# Patient Record
Sex: FEMALE | Race: OTHER | HISPANIC OR LATINO | URBAN - METROPOLITAN AREA
[De-identification: names, ages, dates, MRNs, and addresses within clinical notes are randomized per-mention and may not be internally consistent; named-entity substitution may affect disease eponyms.]

---

## 2022-11-28 ENCOUNTER — EMERGENCY (EMERGENCY)
Facility: HOSPITAL | Age: 67
LOS: 1 days | Discharge: HOME | End: 2022-11-28
Attending: EMERGENCY MEDICINE
Payer: MEDICAID

## 2022-11-28 VITALS
SYSTOLIC BLOOD PRESSURE: 128 MMHG | OXYGEN SATURATION: 98 % | DIASTOLIC BLOOD PRESSURE: 79 MMHG | RESPIRATION RATE: 18 BRPM | TEMPERATURE: 98 F | HEART RATE: 72 BPM

## 2022-11-28 VITALS
TEMPERATURE: 98 F | WEIGHT: 152.12 LBS | RESPIRATION RATE: 17 BRPM | HEART RATE: 78 BPM | HEIGHT: 59.84 IN | SYSTOLIC BLOOD PRESSURE: 135 MMHG | DIASTOLIC BLOOD PRESSURE: 73 MMHG

## 2022-11-28 LAB
ALBUMIN SERPL ELPH-MCNC: 4.1 G/DL — SIGNIFICANT CHANGE UP (ref 3.3–5)
ALP SERPL-CCNC: 166 U/L — HIGH (ref 40–120)
ALT FLD-CCNC: 17 U/L — SIGNIFICANT CHANGE UP (ref 10–45)
ANION GAP SERPL CALC-SCNC: 9 MMOL/L — SIGNIFICANT CHANGE UP (ref 5–17)
APPEARANCE UR: CLEAR — SIGNIFICANT CHANGE UP
AST SERPL-CCNC: 18 U/L — SIGNIFICANT CHANGE UP (ref 10–40)
BASE EXCESS BLDV CALC-SCNC: 5.1 MMOL/L — HIGH (ref -2–3)
BASOPHILS # BLD AUTO: 0.05 K/UL — SIGNIFICANT CHANGE UP (ref 0–0.2)
BASOPHILS NFR BLD AUTO: 0.6 % — SIGNIFICANT CHANGE UP (ref 0–2)
BILIRUB SERPL-MCNC: 0.3 MG/DL — SIGNIFICANT CHANGE UP (ref 0.2–1.2)
BILIRUB UR-MCNC: NEGATIVE — SIGNIFICANT CHANGE UP
BUN SERPL-MCNC: 11 MG/DL — SIGNIFICANT CHANGE UP (ref 7–23)
CA-I SERPL-SCNC: 1.24 MMOL/L — SIGNIFICANT CHANGE UP (ref 1.15–1.33)
CALCIUM SERPL-MCNC: 9.4 MG/DL — SIGNIFICANT CHANGE UP (ref 8.4–10.5)
CHLORIDE BLDV-SCNC: 104 MMOL/L — SIGNIFICANT CHANGE UP (ref 96–108)
CHLORIDE SERPL-SCNC: 104 MMOL/L — SIGNIFICANT CHANGE UP (ref 96–108)
CO2 BLDV-SCNC: 34 MMOL/L — HIGH (ref 22–26)
CO2 SERPL-SCNC: 29 MMOL/L — SIGNIFICANT CHANGE UP (ref 22–31)
COLOR SPEC: SIGNIFICANT CHANGE UP
CREAT SERPL-MCNC: 0.6 MG/DL — SIGNIFICANT CHANGE UP (ref 0.5–1.3)
DIFF PNL FLD: NEGATIVE — SIGNIFICANT CHANGE UP
EGFR: 97 ML/MIN/1.73M2 — SIGNIFICANT CHANGE UP
EOSINOPHIL # BLD AUTO: 0.15 K/UL — SIGNIFICANT CHANGE UP (ref 0–0.5)
EOSINOPHIL NFR BLD AUTO: 1.9 % — SIGNIFICANT CHANGE UP (ref 0–6)
GAS PNL BLDV: 136 MMOL/L — SIGNIFICANT CHANGE UP (ref 136–145)
GAS PNL BLDV: SIGNIFICANT CHANGE UP
GAS PNL BLDV: SIGNIFICANT CHANGE UP
GLUCOSE BLDV-MCNC: 134 MG/DL — HIGH (ref 70–99)
GLUCOSE SERPL-MCNC: 130 MG/DL — HIGH (ref 70–99)
GLUCOSE UR QL: NEGATIVE — SIGNIFICANT CHANGE UP
HCO3 BLDV-SCNC: 32 MMOL/L — HIGH (ref 22–29)
HCT VFR BLD CALC: 43.5 % — SIGNIFICANT CHANGE UP (ref 34.5–45)
HCT VFR BLDA CALC: 47 % — HIGH (ref 34.5–46.5)
HGB BLD CALC-MCNC: 15.6 G/DL — SIGNIFICANT CHANGE UP (ref 11.7–16.1)
HGB BLD-MCNC: 13.9 G/DL — SIGNIFICANT CHANGE UP (ref 11.5–15.5)
IMM GRANULOCYTES NFR BLD AUTO: 0.6 % — SIGNIFICANT CHANGE UP (ref 0–0.9)
KETONES UR-MCNC: NEGATIVE — SIGNIFICANT CHANGE UP
LACTATE BLDV-MCNC: 1.9 MMOL/L — SIGNIFICANT CHANGE UP (ref 0.5–2)
LEUKOCYTE ESTERASE UR-ACNC: NEGATIVE — SIGNIFICANT CHANGE UP
LIDOCAIN IGE QN: 35 U/L — SIGNIFICANT CHANGE UP (ref 7–60)
LYMPHOCYTES # BLD AUTO: 2.27 K/UL — SIGNIFICANT CHANGE UP (ref 1–3.3)
LYMPHOCYTES # BLD AUTO: 28.3 % — SIGNIFICANT CHANGE UP (ref 13–44)
MCHC RBC-ENTMCNC: 28.7 PG — SIGNIFICANT CHANGE UP (ref 27–34)
MCHC RBC-ENTMCNC: 32 GM/DL — SIGNIFICANT CHANGE UP (ref 32–36)
MCV RBC AUTO: 89.9 FL — SIGNIFICANT CHANGE UP (ref 80–100)
MONOCYTES # BLD AUTO: 0.68 K/UL — SIGNIFICANT CHANGE UP (ref 0–0.9)
MONOCYTES NFR BLD AUTO: 8.5 % — SIGNIFICANT CHANGE UP (ref 2–14)
NEUTROPHILS # BLD AUTO: 4.82 K/UL — SIGNIFICANT CHANGE UP (ref 1.8–7.4)
NEUTROPHILS NFR BLD AUTO: 60.1 % — SIGNIFICANT CHANGE UP (ref 43–77)
NITRITE UR-MCNC: NEGATIVE — SIGNIFICANT CHANGE UP
NRBC # BLD: 0 /100 WBCS — SIGNIFICANT CHANGE UP (ref 0–0)
PCO2 BLDV: 54 MMHG — HIGH (ref 39–42)
PH BLDV: 7.38 — SIGNIFICANT CHANGE UP (ref 7.32–7.43)
PH UR: 6.5 — SIGNIFICANT CHANGE UP (ref 5–8)
PLATELET # BLD AUTO: 244 K/UL — SIGNIFICANT CHANGE UP (ref 150–400)
PO2 BLDV: 25 MMHG — SIGNIFICANT CHANGE UP (ref 25–45)
POTASSIUM BLDV-SCNC: 4.5 MMOL/L — SIGNIFICANT CHANGE UP (ref 3.5–5.1)
POTASSIUM SERPL-MCNC: 4.4 MMOL/L — SIGNIFICANT CHANGE UP (ref 3.5–5.3)
POTASSIUM SERPL-SCNC: 4.4 MMOL/L — SIGNIFICANT CHANGE UP (ref 3.5–5.3)
PROT SERPL-MCNC: 7.3 G/DL — SIGNIFICANT CHANGE UP (ref 6–8.3)
PROT UR-MCNC: SIGNIFICANT CHANGE UP
RBC # BLD: 4.84 M/UL — SIGNIFICANT CHANGE UP (ref 3.8–5.2)
RBC # FLD: 13.8 % — SIGNIFICANT CHANGE UP (ref 10.3–14.5)
SAO2 % BLDV: 36.5 % — LOW (ref 67–88)
SODIUM SERPL-SCNC: 142 MMOL/L — SIGNIFICANT CHANGE UP (ref 135–145)
SP GR SPEC: 1.02 — SIGNIFICANT CHANGE UP (ref 1.01–1.02)
UROBILINOGEN FLD QL: NEGATIVE — SIGNIFICANT CHANGE UP
WBC # BLD: 8.02 K/UL — SIGNIFICANT CHANGE UP (ref 3.8–10.5)
WBC # FLD AUTO: 8.02 K/UL — SIGNIFICANT CHANGE UP (ref 3.8–10.5)

## 2022-11-28 PROCEDURE — 84132 ASSAY OF SERUM POTASSIUM: CPT

## 2022-11-28 PROCEDURE — 85014 HEMATOCRIT: CPT

## 2022-11-28 PROCEDURE — 99284 EMERGENCY DEPT VISIT MOD MDM: CPT | Mod: 25

## 2022-11-28 PROCEDURE — 84295 ASSAY OF SERUM SODIUM: CPT

## 2022-11-28 PROCEDURE — 80053 COMPREHEN METABOLIC PANEL: CPT

## 2022-11-28 PROCEDURE — 87086 URINE CULTURE/COLONY COUNT: CPT

## 2022-11-28 PROCEDURE — 82947 ASSAY GLUCOSE BLOOD QUANT: CPT

## 2022-11-28 PROCEDURE — 74177 CT ABD & PELVIS W/CONTRAST: CPT | Mod: MA

## 2022-11-28 PROCEDURE — 81003 URINALYSIS AUTO W/O SCOPE: CPT

## 2022-11-28 PROCEDURE — 83690 ASSAY OF LIPASE: CPT

## 2022-11-28 PROCEDURE — 85018 HEMOGLOBIN: CPT

## 2022-11-28 PROCEDURE — 82435 ASSAY OF BLOOD CHLORIDE: CPT

## 2022-11-28 PROCEDURE — 82330 ASSAY OF CALCIUM: CPT

## 2022-11-28 PROCEDURE — 82803 BLOOD GASES ANY COMBINATION: CPT

## 2022-11-28 PROCEDURE — 85025 COMPLETE CBC W/AUTO DIFF WBC: CPT

## 2022-11-28 PROCEDURE — 74177 CT ABD & PELVIS W/CONTRAST: CPT | Mod: 26,MA

## 2022-11-28 PROCEDURE — 99285 EMERGENCY DEPT VISIT HI MDM: CPT

## 2022-11-28 PROCEDURE — 83605 ASSAY OF LACTIC ACID: CPT

## 2022-11-28 RX ORDER — ACETAMINOPHEN 500 MG
650 TABLET ORAL ONCE
Refills: 0 | Status: COMPLETED | OUTPATIENT
Start: 2022-11-28 | End: 2022-11-28

## 2022-11-28 RX ORDER — SODIUM CHLORIDE 9 MG/ML
1000 INJECTION INTRAMUSCULAR; INTRAVENOUS; SUBCUTANEOUS ONCE
Refills: 0 | Status: COMPLETED | OUTPATIENT
Start: 2022-11-28 | End: 2022-11-28

## 2022-11-28 RX ADMIN — SODIUM CHLORIDE 1000 MILLILITER(S): 9 INJECTION INTRAMUSCULAR; INTRAVENOUS; SUBCUTANEOUS at 11:49

## 2022-11-28 NOTE — ED ADULT TRIAGE NOTE - NSWEIGHTCALCTOOLDRUG_GEN_A_CORE
Body Location Override (Optional): midline nasal bridge
Detail Level: Detailed
Add 37999 Cpt? (Important Note: In 2017 The Use Of 16743 Is Being Tracked By Cms To Determine Future Global Period Reimbursement For Global Periods): yes
Wound Evaluated By (Optional): Michael Suarez MD
Wound Diameter In Cm(Optional): 0
 used

## 2022-11-28 NOTE — ED ADULT NURSE NOTE - OBJECTIVE STATEMENT
Pt came in c/o abdominal pain. Pt has a hx: umbilical hernia. Abdomen distended. No distress. Breathing easy and non labored. Pt ambulatory. Pt alert and orientedX4. Pt Maltese speaking with a relative translating at bedside. Pt observed to be uncomfortable when getting up from stretcher.

## 2022-11-28 NOTE — ED PROVIDER NOTE - PATIENT PORTAL LINK FT
You can access the FollowMyHealth Patient Portal offered by [facility] by registering at the following website: [email or web address]. By joining [employer]’s FollowMyHealth portal, you will also be able to view your health information using other applications (apps) compatible with our system.

## 2022-11-28 NOTE — ED PROVIDER NOTE - OBJECTIVE STATEMENT
68yo woman with 66yo woman with no PMH, PSx umbilical and inguinal hernia repair ~35 years ago, presenting with 2 weeks of intermittent abdominal pain and distention. Patient states she has had this in the past but it resolves after some time, decreased BM when abdominal distention present. Denies fevers, chest pain. Last BM yesterday, passing gas. Prefers to lay flat 2/2 pain.

## 2022-11-28 NOTE — ED PROVIDER NOTE - PHYSICAL EXAMINATION
Gen: NAD, AOx3, able to make needs known, non-toxic  Head: NCAT  HEENT: EOMI, normal conjunctiva  Lung: CTAB, no respiratory distress, no wheezes/rhonchi/rales B/L, speaking in full sentences  CV: RRR, no M/R/G, pulses bilaterally   Abd: soft, tender along the suprapubic and RLQ quadrants, +supraumbilical distention when sitting up around 6in x 4in, no guarding  MSK: no visible bony deformities  Neuro: No focal sensory or motor deficits  Skin: Warm, well perfused, no rash  Psych: normal affect

## 2022-11-28 NOTE — CONSULT NOTE ADULT - ATTENDING COMMENTS
Late entry, patient seen and examined 11/29 at around 4pm.    Patient with long standing hernias, with intermittent severe pain, here for similar pain. On CT, fat containing ventral, recurrent left inguinal and right inguinal hernias without inflammatory changes.  Comfortable in the stretcher but mildly tender on exam. Given no emergent indication for the OR, she will follow up for outpatient, and we will plan for robotic bilateral inguinal hernia repair and ventral hernia repair.  All questions answered.    Faith Mayer MD

## 2022-11-28 NOTE — ED PROVIDER NOTE - ATTENDING CONTRIBUTION TO CARE
RGUJRAL 66yo female hx umbilical hernia repair BIB daughter for periumbilical abd pain x 2 d. States she has intermittent pain with a noticeable bulge that is self resolving. Denies any n/v/d. No f/c, hematuria or dysuria.   On exam, Patient is awake,alert,oriented x 3. Patient is well appearing and in no acute distress. Patient's chest is clear to ausculation, +s1s2. Abdomen is soft nd/+ R periumbilical ttp +BS. Extremity with no swelling or calf tenderness. No cvat. No rash.  Check labs, CT to eval for hernia, obstruction. Pain control and monitor.

## 2022-11-28 NOTE — ED PROVIDER NOTE - PROGRESS NOTE DETAILS
Florentin, PGY2 - surgery consulted for symptomatic fat containing hernia, will come see patient. Florentin, PGY2 - Surgery to see outpatient, no indication for emergent surgery. Patient stable for discharge. Understands the Emergency Room work-up and discharge precautions.

## 2022-11-28 NOTE — CONSULT NOTE ADULT - SUBJECTIVE AND OBJECTIVE BOX
GENERAL SURGERY CONSULT NOTE  HPI: 66yo woman with no PMH, PSx umbilical and inguinal hernia repair ~35 years ago, presenting with 2 weeks of intermittent abdominal pain and distention. Patient states she has had this in the past but it resolves after some time, decreased BM when abdominal distention present. Denies fevers, chest pain. Last BM yesterday, passing gas. Prefers to lay flat 2/2 pain        PAST MEDICAL & SURGICAL HISTORY:  No pertinent past medical history      No significant past surgical history          Home Medications:        Allergies    No Known Allergies    Intolerances        SOCIAL HISTORY:    FAMILY HISTORY:          Physical Exam:  General: NAD, resting comfortably  HEENT: NC/AT, EOMI, normal hearing, no oral lesions, no LAD, neck supple  Pulmonary: normal resp effort, CTA-B  Cardiovascular: NSR, no murmurs  Abdominal: soft, ND/NT, no organomegaly  Extremities: WWP, normal strength, no clubbing/cyanosis/edema  Neuro: A/O x 3, CNs II-XII grossly intact, normal sensation, no focal deficits  Pulses: palpable distal pulses    Vital Signs Last 24 Hrs  T(C): 36.6 (2022 09:41), Max: 36.6 (2022 09:41)  T(F): 97.8 (2022 09:41), Max: 97.8 (2022 09:41)  HR: 78 (2022 09:41) (78 - 78)  BP: 135/73 (2022 09:41) (135/73 - 135/73)  BP(mean): --  RR: 17 (2022 09:41) (17 - 17)  SpO2: --    Parameters below as of 2022 09:41  Patient On (Oxygen Delivery Method): 97        I&O's Summary          LABS:                        13.9   8.02  )-----------( 244      ( 2022 11:40 )             43.5     -    142  |  104  |  11  ----------------------------<  130<H>  4.4   |  29  |  0.60    Ca    9.4      2022 11:40    TPro  7.3  /  Alb  4.1  /  TBili  0.3  /  DBili  x   /  AST  18  /  ALT  17  /  AlkPhos  166<H>        Urinalysis Basic - ( 2022 11:40 )    Color: Light Yellow / Appearance: Clear / S.018 / pH: x  Gluc: x / Ketone: Negative  / Bili: Negative / Urobili: Negative   Blood: x / Protein: Trace / Nitrite: Negative   Leuk Esterase: Negative / RBC: x / WBC x   Sq Epi: x / Non Sq Epi: x / Bacteria: x      CAPILLARY BLOOD GLUCOSE        LIVER FUNCTIONS - ( 2022 11:40 )  Alb: 4.1 g/dL / Pro: 7.3 g/dL / ALK PHOS: 166 U/L / ALT: 17 U/L / AST: 18 U/L / GGT: x               RADIOLOGY & ADDITIONAL STUDIES:  < from: CT Abdomen and Pelvis w/ IV Cont (22 @ 11:27) >    FINDINGS:  LOWER CHEST: Within normal limits.    LIVER: Several calcifications in the periphery of the right and left   hepatic lobe may represent granulomas or the site of prior intervention.   A few scattered hypodense hepatic foci measuring up to 1.3 cm are   indeterminate.  BILE DUCTS: Normal caliber.  GALLBLADDER: Within normal limits.  SPLEEN: Within normal limits.  PANCREAS: Within normal limits.  ADRENALS: Within normal limits.  KIDNEYS/URETERS: A few punctate obstructing left renal calculi. No   hydronephrosis. Bilateralrenal cysts.    BLADDER: Within normal limits.  REPRODUCTIVE ORGANS: Uterus and adnexa within normal limits.    BOWEL: No bowel obstruction. Colonic diverticulosis. Appendix is normal.  PERITONEUM: No ascites.  VESSELS: Within normal limits.  RETROPERITONEUM/LYMPH NODES: No lymphadenopathy.  ABDOMINAL WALL: Supraumbilical fat-containing abdominal wall hernias with   minimal associated inflammation. Bilateral fat-containing inguinal   hernias.  BONES: Degenerative changes.    IMPRESSION:  Supraumbilical fat-containing abdominal wall hernias with minimal   associated inflammation.    Bilateral fat-containing inguinal hernias.    < end of copied text >   GENERAL SURGERY CONSULT NOTE  HPI: 66yo woman with no PMH, PSx umbilical and inguinal hernia repair ~35 years ago, presenting with 2 weeks of intermittent abdominal pain and distention. Patient states she has had this in the past but it resolves after some time, decreased BM when abdominal distention present. Denies fevers, chest pain. Last BM yesterday, passing gas. Prefers to lay flat 2/2 pain        PAST MEDICAL & SURGICAL HISTORY:  No pertinent past medical history      No significant past surgical history          Home Medications:        Allergies    No Known Allergies    Intolerances        SOCIAL HISTORY: Denies tobacco, EtOH      Physical Exam:  General: NAD, resting comfortably  HEENT: NC/AT, EOMI, normal hearing, no oral lesions, no LAD, neck supple  Pulmonary: normal resp effort on RA  Cardiovascular: RRR  Abdominal: soft, ND/NT, no organomegaly. Well healed prior surgical scars note  Groin: Mild swelling noted in r inguinal region. Tender to palpation in L groin. No overlying skin changes bilaterally  Extremities: WWP, normal strength, no clubbing/cyanosis/edema  Neuro: A/O x 3  Pulses: palpable distal pulses      Vital Signs Last 24 Hrs  T(C): 36.6 (2022 09:41), Max: 36.6 (2022 09:41)  T(F): 97.8 (2022 09:41), Max: 97.8 (2022 09:41)  HR: 78 (2022 09:41) (78 - 78)  BP: 135/73 (2022 09:41) (135/73 - 135/73)  BP(mean): --  RR: 17 (2022 09:41) (17 - 17)  SpO2: --    Parameters below as of 2022 09:41  Patient On (Oxygen Delivery Method): 97        I&O's Summary          LABS:                        13.9   8.02  )-----------( 244      ( 2022 11:40 )             43.5         142  |  104  |  11  ----------------------------<  130<H>  4.4   |  29  |  0.60    Ca    9.4      2022 11:40    TPro  7.3  /  Alb  4.1  /  TBili  0.3  /  DBili  x   /  AST  18  /  ALT  17  /  AlkPhos  166<H>        Urinalysis Basic - ( 2022 11:40 )    Color: Light Yellow / Appearance: Clear / S.018 / pH: x  Gluc: x / Ketone: Negative  / Bili: Negative / Urobili: Negative   Blood: x / Protein: Trace / Nitrite: Negative   Leuk Esterase: Negative / RBC: x / WBC x   Sq Epi: x / Non Sq Epi: x / Bacteria: x      CAPILLARY BLOOD GLUCOSE        LIVER FUNCTIONS - ( 2022 11:40 )  Alb: 4.1 g/dL / Pro: 7.3 g/dL / ALK PHOS: 166 U/L / ALT: 17 U/L / AST: 18 U/L / GGT: x               RADIOLOGY & ADDITIONAL STUDIES:  < from: CT Abdomen and Pelvis w/ IV Cont (22 @ 11:27) >    FINDINGS:  LOWER CHEST: Within normal limits.    LIVER: Several calcifications in the periphery of the right and left   hepatic lobe may represent granulomas or the site of prior intervention.   A few scattered hypodense hepatic foci measuring up to 1.3 cm are   indeterminate.  BILE DUCTS: Normal caliber.  GALLBLADDER: Within normal limits.  SPLEEN: Within normal limits.  PANCREAS: Within normal limits.  ADRENALS: Within normal limits.  KIDNEYS/URETERS: A few punctate obstructing left renal calculi. No   hydronephrosis. Bilateralrenal cysts.    BLADDER: Within normal limits.  REPRODUCTIVE ORGANS: Uterus and adnexa within normal limits.    BOWEL: No bowel obstruction. Colonic diverticulosis. Appendix is normal.  PERITONEUM: No ascites.  VESSELS: Within normal limits.  RETROPERITONEUM/LYMPH NODES: No lymphadenopathy.  ABDOMINAL WALL: Supraumbilical fat-containing abdominal wall hernias with   minimal associated inflammation. Bilateral fat-containing inguinal   hernias.  BONES: Degenerative changes.    IMPRESSION:  Supraumbilical fat-containing abdominal wall hernias with minimal   associated inflammation.    Bilateral fat-containing inguinal hernias.    < end of copied text >   GENERAL SURGERY CONSULT NOTE  HPI: 68yo woman with no PMH, PSx umbilical and inguinal hernia repair ~35 years ago, presenting with 2 weeks of intermittent abdominal pain and distention. Patient states she has had this in the past but it resolves after some time, decreased BM when abdominal distention present. Denies fevers, chest pain. Last BM yesterday, passing gas. Prefers to lay flat 2/2 pain      PAST MEDICAL & SURGICAL HISTORY:  No pertinent past medical history      Prior umbilical and inguinal hernia repair ~35 years ago      Home Medications: None      Allergies    No Known Allergies    Intolerances      SOCIAL HISTORY: Denies tobacco, EtOH      Physical Exam:  General: NAD, resting comfortably  HEENT: NC/AT, EOMI, normal hearing, no oral lesions, no LAD, neck supple  Pulmonary: normal resp effort on RA  Cardiovascular: RRR  Abdominal: soft, ND/NT, no organomegaly. Well healed prior surgical scars note  Groin: Mild swelling noted in R inguinal region. Tender to palpation in L groin. Well healed L inguinal scar. No overlying skin changes bilaterally  Extremities: WWP, normal strength, no clubbing/cyanosis/edema  Neuro: A/O x 3  Pulses: palpable distal pulses      Vital Signs Last 24 Hrs  T(C): 36.6 (2022 09:41), Max: 36.6 (2022 09:41)  T(F): 97.8 (2022 09:41), Max: 97.8 (2022 09:41)  HR: 78 (2022 09:41) (78 - 78)  BP: 135/73 (2022 09:41) (135/73 - 135/73)  BP(mean): --  RR: 17 (2022 09:41) (17 - 17)  SpO2: --    Parameters below as of 2022 09:41  Patient On (Oxygen Delivery Method): 97        I&O's Summary          LABS:                        13.9   8.02  )-----------( 244      ( 2022 11:40 )             43.5         142  |  104  |  11  ----------------------------<  130<H>  4.4   |  29  |  0.60    Ca    9.4      2022 11:40    TPro  7.3  /  Alb  4.1  /  TBili  0.3  /  DBili  x   /  AST  18  /  ALT  17  /  AlkPhos  166<H>        Urinalysis Basic - ( 2022 11:40 )    Color: Light Yellow / Appearance: Clear / S.018 / pH: x  Gluc: x / Ketone: Negative  / Bili: Negative / Urobili: Negative   Blood: x / Protein: Trace / Nitrite: Negative   Leuk Esterase: Negative / RBC: x / WBC x   Sq Epi: x / Non Sq Epi: x / Bacteria: x      CAPILLARY BLOOD GLUCOSE        LIVER FUNCTIONS - ( 2022 11:40 )  Alb: 4.1 g/dL / Pro: 7.3 g/dL / ALK PHOS: 166 U/L / ALT: 17 U/L / AST: 18 U/L / GGT: x               RADIOLOGY & ADDITIONAL STUDIES:  < from: CT Abdomen and Pelvis w/ IV Cont (22 @ 11:27) >    FINDINGS:  LOWER CHEST: Within normal limits.    LIVER: Several calcifications in the periphery of the right and left   hepatic lobe may represent granulomas or the site of prior intervention.   A few scattered hypodense hepatic foci measuring up to 1.3 cm are   indeterminate.  BILE DUCTS: Normal caliber.  GALLBLADDER: Within normal limits.  SPLEEN: Within normal limits.  PANCREAS: Within normal limits.  ADRENALS: Within normal limits.  KIDNEYS/URETERS: A few punctate obstructing left renal calculi. No   hydronephrosis. Bilateralrenal cysts.    BLADDER: Within normal limits.  REPRODUCTIVE ORGANS: Uterus and adnexa within normal limits.    BOWEL: No bowel obstruction. Colonic diverticulosis. Appendix is normal.  PERITONEUM: No ascites.  VESSELS: Within normal limits.  RETROPERITONEUM/LYMPH NODES: No lymphadenopathy.  ABDOMINAL WALL: Supraumbilical fat-containing abdominal wall hernias with   minimal associated inflammation. Bilateral fat-containing inguinal   hernias.  BONES: Degenerative changes.    IMPRESSION:  Supraumbilical fat-containing abdominal wall hernias with minimal   associated inflammation.    Bilateral fat-containing inguinal hernias.    < end of copied text >

## 2022-11-28 NOTE — ED PROVIDER NOTE - NSFOLLOWUPINSTRUCTIONS_ED_ALL_ED_FT
Lo vieron en la vijay de emergencias hoy debido a un dolor de hernia abdominal. Se incluye dandy copia de nicolas resultados en talbot documentación de elva.    Joi un seguimiento con talbot médico de atención primaria dentro de la semana.  También le recomendamos que joi un seguimiento con un cirujano para dandy mayor gestión y evaluación. Debe programar dandy leona para raad al Dr. Mayer el 6 de diciembre. La información de contacto se encuentra a continuación:    Dra. Faith Mayer  [address] [facility], [facility] [address], [place] [address]  [phone]    LO QUE NECESITAS SABER:  Dandy hernia es el abultamiento de un órgano o tejido a través de un punto débil en los músculos del abdomen (pared abdominal). Las hernias se desarrollan con mayor frecuencia cerca del ombligo (ombligo) o en el área donde la pierna se encuentra con la parte inferior del abdomen (gaby).    Siga estas instrucciones en casa:  Actividad  •Evite esforzarse.  • No levante nada que pese más de 10 libras (4,5 kg), o el límite que le indiquen, hasta que talbot proveedor de atención médica le diga que es seguro hacerlo.  •Cuando levante objetos pesados, hágalo con los músculos de las piernas, no con los músculos de la espalda.    Instrucciones generales  •Al toser, trate de toser suavemente.  •Puede intentar empujar la hernia hacia atrás en talbot lugar presionándola muy suavemente mientras está acostado. No intente forzar el bulto hacia adentro si no lo hace con facilidad.  •Si tiene sobrepeso, colabore con talbot proveedor de atención médica para perder peso de manera siu.  • No use ningún producto que contenga nicotina o tabaco, annie cigarrillos y cigarrillos electrónicos. Si necesita ayuda para dejar de fumar, consulte a talbot proveedor de atención médica.  •Si tiene programada dandy reparación de hernia, vigile talbot hernia para detectar cualquier cambio en la forma, el tamaño o el color. Informe a talbot proveedor de atención médica sobre cualquier cambio o síntoma nuevo.  •Asista a todas las visitas de seguimiento según lo indicado por talbot proveedor de atención médica. [place] es importante.    Regrese a la vijay de emergencias si:  •Tienes fiebre.  •Tiene dolor en el abdomen que empeora.  • Siente náuseas o vomita.  •No puede volver a colocar la hernia en talbot lugar presionándola muy suavemente mientras está acostado. No intente forzar el bulto hacia adentro si no lo hace con facilidad.  •La hernia o la piel que la recubre cambia de forma, tamaño o color, o se siente dura o sensible.    Estos síntomas pueden representar un problema grave que es dandy emergencia. No espere a raad si los síntomas desaparecen. Obtenga ayuda médica de inmediato. Llame a los servicios de emergencia locales (911 en los [place]      ---------------------      You were seen in the Emergency Room today because of abdominal hernia pain. A copy of your results is included in your discharge paperwork.     Please follow-up with your Primary Care Physician within the week.   We also recommend you following up with a Surgeon for further management and workup. You should make an appointment to see Dr. Mayer on December 6th. The contact information is below:     Dr. Faith Mayer  [address] [place] [address], [facility] [address] [address]  [phone]    WHAT YOU NEED TO KNOW:   A hernia is the bulging of an organ or tissue through a weak spot in the muscles of the abdomen (abdominal wall). Hernias develop most often near the belly button (navel) or the area where the leg meets the lower abdomen (groin).    Follow these instructions at home:  Activity   •Avoid straining.  • Do not lift anything that is heavier than 10 lb (4.5 kg), or the limit that you are told, until your health care provider says that it is safe.  •When lifting heavy objects, lift with your leg muscles, not your back muscles.    General instructions   •When coughing, try to cough gently.  •You may try to push the hernia back in place by very gently pressing on it while lying down. Do not try to force the bulge back in if it will not push in easily.  •If you are overweight, work with your health care provider to lose weight safely.  • Do not use any products that contain nicotine or tobacco, such as cigarettes and e-cigarettes. If you need help quitting, ask your health care provider.  •If you are scheduled for hernia repair, watch your hernia for any changes in shape, size, or color. Tell your health care provider about any changes or new symptoms.  •Keep all follow-up visits as told by your health care provider. This is important.    Please return to the Emergency Room if:   •You have a fever.  •You have abdomen pain that gets worse.  •You feel nauseous or you vomit.  •You cannot push the hernia back in place by very gently pressing on it while lying down. Do not try to force the bulge back in if it will not push in easily.  •The hernia or overlying skin changes in shape, size, or color, or feels hard or tender.    These symptoms may represent a serious problem that is an emergency. Do not wait to see if the symptoms will go away. Get medical help right away. Call your local emergency services (911 in the [place]

## 2022-11-28 NOTE — ED PROVIDER NOTE - CLINICAL SUMMARY MEDICAL DECISION MAKING FREE TEXT BOX
Florentin, PGY2 - abd pain and hernial related distention for the last 2 weeks, continues to have bm and gas passage, most likely hernia recurrence. denies fevers. decreased suspicion for sbo or lbo. labs, ct a/p, reassess. *The above represents an initial assessment/impression. Please refer to progress notes for potential changes in patient clinical course*

## 2022-11-29 LAB
CULTURE RESULTS: SIGNIFICANT CHANGE UP
SPECIMEN SOURCE: SIGNIFICANT CHANGE UP